# Patient Record
(demographics unavailable — no encounter records)

---

## 2025-03-06 NOTE — HISTORY OF PRESENT ILLNESS
[FreeTextEntry2] : Nadine Arana' is a 17 year 2 month old female with glucose intolerance and irregular menses who returns for follow up. She was initially referred in 5/2024 (age 16y5m). Mom reported that Mechelle gained a lot of weight since COVID pandemic. Pediatrician ordered labs on 4/1/24 that showed neg thyroid Ab and neg TSH Rab, elevated CRP 31 and ESR 30, +GRETEL, normal TFTs, normal CMP, CBC, Chol 235, , , HDL 39, neg UA, A1C 6.5 %. At my visit, her POC A1c was 6.1 %. Lab work later performed on 7/19/24 that showed: fasting glucose 108 mg/dL, 2 hour glucose 159 mg/dL (H), insulin 42.5 uU/mL (H), c-peptide 3.0 ng/mL, CMP (glucose 123 mg/dL, remainder normal), TONI Ab 0.06 nmol/L (weakly positive), FSH 16 (H); normal: islet cell Ab, insulin Ab, IA-2 Ab, zinc transporter 8 Ab, prolactin, hCG, LH, estradiol, total testosterone, androstenedione, DHEAS, 17OHP. Last seen by nutrition in 7/2024 (A1c 6.6 %).  Mechelle had menarche at 12 yo. Menses were every 2-3 months until 10/2023 (LMP). Labs from 10/2/24 showed: FSH 17 (H); normal: LH 15, estradiol 44, AMH 2.33, CMP, abnormal lipid panel (total 218, TG 88, HDL 33 (L),  (H, but decreased)). Repeat labs on 10/24/24 showed: FSH 15 mIU/ml (H), 25(OH)D 21.8 ng/mL (insufficient); normal: LH 14 mIU/mL, estradiol 23 pg/mL, AMH 2.23 ng/mL, negative adrenal Ab, chromosome analysis (46, XX), negative fragile X screen, TSH 3.14 uIU/mL, T4, free T4, thyroid antibodies. Attempted to set up telehealth to review results.   Mechelle now returns for follow up and her A1c is 5.8 %. Nadine says she is snacking less and trying a little to eat healthier. Since 10/2024, she lost ~ 10 lbs.  Mother says that she often does not like to socialize; she prefers to stay home by herself. Complains that everything is boring all the time.  PHQ9 screen administered - positive screen 14.   plan len spole with family - will refer to therapist.  to plan to refer to [facility]    [FreeTextEntry1] :  Menarche 12 yo; LMP 2/26/25 - 3/4/25 (very heavy), 10/2023, 8/2023

## 2025-06-25 NOTE — HISTORY OF PRESENT ILLNESS
[FreeTextEntry2] : Lizabeth Arana' is a 17 year 6 month old female  with glucose intolerance and irregular menses who returns for follow up. She was initially referred in 5/2024 (age 16y5m). Mom reported that Mechelle gained a lot of weight since COVID pandemic. Pediatrician ordered labs on 4/1/24 that showed neg thyroid Ab and neg TSH Rab, elevated CRP 31 and ESR 30, +GRETEL, normal TFTs, normal CMP, CBC, Chol 235, , , HDL 39, neg UA, A1C 6.5 %. At my visit, her POC A1c was 6.1 %. Lab work later performed on 7/19/24 that showed: fasting glucose 108 mg/dL, 2 hour glucose 159 mg/dL (H), insulin 42.5 uU/mL (H), c-peptide 3.0 ng/mL, CMP (glucose 123 mg/dL, remainder normal), TONI Ab 0.06 nmol/L (weakly positive), FSH 16 (H); normal: islet cell Ab, insulin Ab, IA-2 Ab, zinc transporter 8 Ab, prolactin, hCG, LH, estradiol, total testosterone, androstenedione, DHEAS, 17OHP. Last seen by nutrition in 7/2024 (A1c 6.6 %). Last seen by me in 3/2025 (A1c 5.8 %).   Mechelle had menarche at 12 yo. Menses were every 2-3 months until 10/2023 (LMP). Labs from 10/2/24 showed: FSH 17 (H); normal: LH 15, estradiol 44, AMH 2.33, CMP, abnormal lipid panel (total 218, TG 88, HDL 33 (L),  (H, but decreased)). Repeat labs on 10/24/24 showed: FSH 15 mIU/ml (H), 25(OH)D 21.8 ng/mL (insufficient); normal: LH 14 mIU/mL, estradiol 23 pg/mL, AMH 2.23 ng/mL, negative adrenal Ab, chromosome analysis (46, XX), negative fragile X screen, TSH 3.14 uIU/mL, T4, free T4, thyroid antibodies. Attempted to set up telehealth to review results. Last seen by me in 3/2025; spoke with ANDRES - evaluation and care not covered by patient's insurance at [facility] or elsewhere.  Mechelle now returns for follow up and her A1c is   Efranny says she is snacking less and trying a little to eat healthier. Since 10/2024, she lost ~ 10 lbs. Mother says that she often does not like to socialize; she prefers to stay home by herself. Complains that everything is boring all the time. PHQ9 screen administered - positive screen 14.     [FreeTextEntry1] :  Menarche 12 yo; LMP

## 2025-06-26 NOTE — PHYSICAL EXAM
[Healthy Appearing] : healthy appearing [Well Nourished] : well nourished [Interactive] : interactive [Obese] : obese [Acanthosis Nigricans___] : acanthosis nigricans over [unfilled] [Normal Appearance] : normal appearance [Well formed] : well formed [Normally Set] : normally set [Goiter] : no goiter [Abdomen Soft] : soft [Abdomen Tenderness] : non-tender [] : no hepatosplenomegaly [Normal] : normal

## 2025-06-26 NOTE — DISCUSSION/SUMMARY
[FreeTextEntry1] : Lizabeth Arana' is a 17 year 6 month old female with glucose intolerance and irregular menses who returns for follow up. She is a well appearing adolescent female who is a the 46th percentile for height, and 99th percentile for weight and BMI. Since 3/2025, she has gained ~ 2 lbs. Prior lab work from 7/2024 was consistent with glucose intolerance; fasting insulin was elevated and TONI Ab was weakly positive; remainder of diabetes related antibodies were negative. At high risk for developing type 2 diabetes. POC A1c today continues to be improved at 5.8 %. Stressed the need for continued lifestyle modifications in order to improve her weight and decrease Mechelle's future risk of developing health complications. Recommend continued follow up with nutrition. Repeat labs from 10/2/24 showed LDL persistently elevated at 169, HDL low at 33. CMP normal. To order repeat labs at her next visit in 10/2025.   Mechelle also has irregular menses; menarche occurred at 11 years old. Her periods occurred every 2-3 months until 10/2023 (LMP). Labs from 7/2024 showed an elevated FSH (16). Remainder of labs were normal: LH, estradiol, prolactin, hCG, total testosterone, androstenedione, DHEAS, 17OHP. Ordered repeat labs today: FSH, LH, estradiol, AMH, hCG, Repeat labs from 10/24/24 showed: FSH 15 mIU/ml (H), 25(OH)D 21.8 ng/mL (insufficient); normal: LH 14 mIU/mL, estradiol 23 pg/mL, AMH 2.23 ng/mL, negative adrenal Ab, normal chromosome analysis (46, XX), negative fragile X screen, TSH 3.14 uIU/mL, T4, free T4, thyroid antibodies. ANDRES evaluation not covered by Medicaid insurance (at [facility] or outside). To continue to monitor menses. Prescribed Provera 10 mg daily x 10 days - to take very 3 months only if Mechelle does not have a period.   Recommend follow up in 3 and 6 months.

## 2025-06-26 NOTE — HISTORY OF PRESENT ILLNESS
[FreeTextEntry2] : Lizabeth Arana' is a 17 year 6 month old female  with glucose intolerance and irregular menses who returns for follow up. She was initially referred in 5/2024 (age 16y5m). Mom reported that Mechelle gained a lot of weight since COVID pandemic. Pediatrician ordered labs on 4/1/24 that showed neg thyroid Ab and neg TSH Rab, elevated CRP 31 and ESR 30, +GRETEL, normal TFTs, normal CMP, CBC, Chol 235, , , HDL 39, neg UA, A1C 6.5 %. At my visit, her POC A1c was 6.1 %. Lab work later performed on 7/19/24 that showed: fasting glucose 108 mg/dL, 2 hour glucose 159 mg/dL (H), insulin 42.5 uU/mL (H), c-peptide 3.0 ng/mL, CMP (glucose 123 mg/dL, remainder normal), TONI Ab 0.06 nmol/L (weakly positive), FSH 16 (H); normal: islet cell Ab, insulin Ab, IA-2 Ab, zinc transporter 8 Ab, prolactin, hCG, LH, estradiol, total testosterone, androstenedione, DHEAS, 17OHP. Last seen by nutrition in 7/2024 (A1c 6.6 %). Last seen by me in 3/2025 (A1c 5.8 %).   Mechelle had menarche at 10 yo. Menses were every 2-3 months until 10/2023 (LMP). Labs from 10/2/24 showed: FSH 17 (H); normal: LH 15, estradiol 44, AMH 2.33, CMP, abnormal lipid panel (total 218, TG 88, HDL 33 (L),  (H, but decreased)). Repeat labs on 10/24/24 showed: FSH 15 mIU/ml (H), 25(OH)D 21.8 ng/mL (insufficient); normal: LH 14 mIU/mL, estradiol 23 pg/mL, AMH 2.23 ng/mL, negative adrenal Ab, chromosome analysis (46, XX), negative fragile X screen, TSH 3.14 uIU/mL, T4, free T4, thyroid antibodies. Attempted to set up telehealth to review results. Last seen by me in 3/2025; spoke with ANDRES - evaluation and care not covered by patient's insurance at [facility] or elsewhere.  Mechelle now returns for follow up. Her A1c yesterday at nutrition visit was 5.8 %. Since 3/2025, she has gained ~ 2 lbs (from 10/2024 to 3/2025, she had lost ~9 lbs via dietary changes). Has not made appts with any counselors that were previously recommended.   Working at the [employer] since 5/2025.   Mother says that she often does not like to socialize; she prefers to stay home by herself. Complains that everything is boring all the time. PHQ9 positive in 3/2025. Counselors contacts previously provided - have not made appts.       [FreeTextEntry1] :  Menarche 10 yo; LMP 5/8-5/15/25 (heavy), 2/26/25 - 3/4/25 (very heavy), 10/2023, 8/2023.

## 2025-06-26 NOTE — CONSULT LETTER
[Courtesy Letter:] : I had the pleasure of seeing your patient, [unfilled], in my office today. [Please see my note below.] : Please see my note below. [Sincerely,] : Sincerely, [FreeTextEntry3] : Hannah Simpson DO